# Patient Record
Sex: MALE | URBAN - METROPOLITAN AREA
[De-identification: names, ages, dates, MRNs, and addresses within clinical notes are randomized per-mention and may not be internally consistent; named-entity substitution may affect disease eponyms.]

---

## 2018-08-31 ENCOUNTER — APPOINTMENT (OUTPATIENT)
Dept: URBAN - METROPOLITAN AREA SURGERY 9 | Age: 33
Setting detail: DERMATOLOGY
End: 2018-08-31

## 2018-08-31 DIAGNOSIS — L24.9 IRRITANT CONTACT DERMATITIS, UNSPECIFIED CAUSE: ICD-10-CM

## 2018-08-31 PROBLEM — I10 ESSENTIAL (PRIMARY) HYPERTENSION: Status: ACTIVE | Noted: 2018-08-31

## 2018-08-31 PROBLEM — L30.9 DERMATITIS, UNSPECIFIED: Status: ACTIVE | Noted: 2018-08-31

## 2018-08-31 PROCEDURE — OTHER COUNSELING: OTHER

## 2018-08-31 PROCEDURE — 99202 OFFICE O/P NEW SF 15 MIN: CPT

## 2018-08-31 PROCEDURE — OTHER PRESCRIPTION: OTHER

## 2018-08-31 PROCEDURE — OTHER TREATMENT REGIMEN: OTHER

## 2018-08-31 RX ORDER — DESONIDE 0.5 MG/G
CREAM TOPICAL
Qty: 1 | Refills: 1 | Status: ERX | COMMUNITY
Start: 2018-08-31

## 2018-08-31 ASSESSMENT — LOCATION DETAILED DESCRIPTION DERM
LOCATION DETAILED: DORSAL PENILE SHAFT
LOCATION DETAILED: DORSAL GLANS PENIS
LOCATION DETAILED: DORSAL PENILE SHAFT
LOCATION DETAILED: DORSAL GLANS PENIS

## 2018-08-31 ASSESSMENT — LOCATION ZONE DERM
LOCATION ZONE: PENIS
LOCATION ZONE: PENIS

## 2018-08-31 ASSESSMENT — LOCATION SIMPLE DESCRIPTION DERM
LOCATION SIMPLE: PENIS
LOCATION SIMPLE: PENIS

## 2018-08-31 NOTE — PROCEDURE: TREATMENT REGIMEN
Otc Regimen: Apply barrier cream to the site to prevent friction and discomfort; mild soap such as dove
Discontinue Regimen: Dial soap
Plan: Consider antifungal or biopsy if not responding to treatment
Initiate Treatment: Desonide 0.05% cream (can mix with vaseline if it burns with application)
Detail Level: Simple

## 2019-05-28 ENCOUNTER — APPOINTMENT (OUTPATIENT)
Dept: URBAN - METROPOLITAN AREA SURGERY 9 | Age: 34
Setting detail: DERMATOLOGY
End: 2019-05-28

## 2019-05-28 DIAGNOSIS — L24.9 IRRITANT CONTACT DERMATITIS, UNSPECIFIED CAUSE: ICD-10-CM

## 2019-05-28 PROBLEM — L30.9 DERMATITIS, UNSPECIFIED: Status: ACTIVE | Noted: 2019-05-28

## 2019-05-28 PROCEDURE — OTHER COUNSELING: OTHER

## 2019-05-28 PROCEDURE — OTHER TREATMENT REGIMEN: OTHER

## 2019-05-28 PROCEDURE — OTHER ORDER TESTS: OTHER

## 2019-05-28 PROCEDURE — OTHER PRESCRIPTION: OTHER

## 2019-05-28 PROCEDURE — 99214 OFFICE O/P EST MOD 30 MIN: CPT

## 2019-05-28 RX ORDER — KETOCONAZOLE 20 MG/G
CREAM TOPICAL
Qty: 1 | Refills: 2 | Status: ERX | COMMUNITY
Start: 2019-05-28

## 2019-05-28 RX ORDER — PIMECROLIMUS 10 MG/G
CREAM TOPICAL
Qty: 1 | Refills: 2 | Status: ERX | COMMUNITY
Start: 2019-05-28

## 2019-05-28 ASSESSMENT — LOCATION ZONE DERM: LOCATION ZONE: PENIS

## 2019-05-28 ASSESSMENT — LOCATION SIMPLE DESCRIPTION DERM: LOCATION SIMPLE: PENIS

## 2019-05-28 ASSESSMENT — LOCATION DETAILED DESCRIPTION DERM: LOCATION DETAILED: DORSAL GLANS PENIS

## 2019-05-28 NOTE — PROCEDURE: TREATMENT REGIMEN
Plan: R/o recurrent viral infection, low clinical suspicion, slight pseudovesiculation. \\nClinically irritant, no rash elsewhere for psoriasis findings.  \\nTrial ketoconazole and TCI, has a retractile etiology to this.
Initiate Treatment: Ketoconazole cream bid\\nDesonide bid x 2 weeks for severe flares\\nElidel bid for mild flares and when off of steroids for maintenance.
Detail Level: Simple

## 2019-05-30 RX ORDER — DESONIDE 0.5 MG/G
CREAM TOPICAL
Qty: 1 | Refills: 1 | Status: CANCELLED
Stop reason: CLARIF

## 2019-05-31 ENCOUNTER — RX ONLY (RX ONLY)
Age: 34
End: 2019-05-31

## 2019-05-31 RX ORDER — HYDROCORTISONE 25 MG/G
CREAM TOPICAL
Qty: 1 | Refills: 2 | Status: ERX | COMMUNITY
Start: 2019-05-31